# Patient Record
Sex: FEMALE | Race: ASIAN | NOT HISPANIC OR LATINO | URBAN - METROPOLITAN AREA
[De-identification: names, ages, dates, MRNs, and addresses within clinical notes are randomized per-mention and may not be internally consistent; named-entity substitution may affect disease eponyms.]

---

## 2023-11-22 ENCOUNTER — EMERGENCY (EMERGENCY)
Facility: HOSPITAL | Age: 78
LOS: 1 days | Discharge: HOME | End: 2023-11-22
Attending: EMERGENCY MEDICINE
Payer: MEDICARE

## 2023-11-22 VITALS
HEIGHT: 62 IN | TEMPERATURE: 98 F | HEART RATE: 111 BPM | RESPIRATION RATE: 20 BRPM | SYSTOLIC BLOOD PRESSURE: 172 MMHG | WEIGHT: 151.68 LBS | DIASTOLIC BLOOD PRESSURE: 84 MMHG | OXYGEN SATURATION: 97 %

## 2023-11-22 DIAGNOSIS — I10 ESSENTIAL (PRIMARY) HYPERTENSION: ICD-10-CM

## 2023-11-22 DIAGNOSIS — R07.9 CHEST PAIN, UNSPECIFIED: ICD-10-CM

## 2023-11-22 LAB
ALBUMIN SERPL ELPH-MCNC: 4.2 G/DL — SIGNIFICANT CHANGE UP (ref 3.3–5.2)
ALBUMIN SERPL ELPH-MCNC: 4.2 G/DL — SIGNIFICANT CHANGE UP (ref 3.3–5.2)
ALP SERPL-CCNC: 99 U/L — SIGNIFICANT CHANGE UP (ref 40–120)
ALP SERPL-CCNC: 99 U/L — SIGNIFICANT CHANGE UP (ref 40–120)
ALT FLD-CCNC: 35 U/L — HIGH
ALT FLD-CCNC: 35 U/L — HIGH
ANION GAP SERPL CALC-SCNC: 15 MMOL/L — SIGNIFICANT CHANGE UP (ref 5–17)
ANION GAP SERPL CALC-SCNC: 15 MMOL/L — SIGNIFICANT CHANGE UP (ref 5–17)
AST SERPL-CCNC: 43 U/L — HIGH
AST SERPL-CCNC: 43 U/L — HIGH
BASOPHILS # BLD AUTO: 0.03 K/UL — SIGNIFICANT CHANGE UP (ref 0–0.2)
BASOPHILS # BLD AUTO: 0.03 K/UL — SIGNIFICANT CHANGE UP (ref 0–0.2)
BASOPHILS NFR BLD AUTO: 0.3 % — SIGNIFICANT CHANGE UP (ref 0–2)
BASOPHILS NFR BLD AUTO: 0.3 % — SIGNIFICANT CHANGE UP (ref 0–2)
BILIRUB SERPL-MCNC: 0.4 MG/DL — SIGNIFICANT CHANGE UP (ref 0.4–2)
BILIRUB SERPL-MCNC: 0.4 MG/DL — SIGNIFICANT CHANGE UP (ref 0.4–2)
BUN SERPL-MCNC: 9.4 MG/DL — SIGNIFICANT CHANGE UP (ref 8–20)
BUN SERPL-MCNC: 9.4 MG/DL — SIGNIFICANT CHANGE UP (ref 8–20)
CALCIUM SERPL-MCNC: 9.1 MG/DL — SIGNIFICANT CHANGE UP (ref 8.4–10.5)
CALCIUM SERPL-MCNC: 9.1 MG/DL — SIGNIFICANT CHANGE UP (ref 8.4–10.5)
CHLORIDE SERPL-SCNC: 103 MMOL/L — SIGNIFICANT CHANGE UP (ref 96–108)
CHLORIDE SERPL-SCNC: 103 MMOL/L — SIGNIFICANT CHANGE UP (ref 96–108)
CO2 SERPL-SCNC: 22 MMOL/L — SIGNIFICANT CHANGE UP (ref 22–29)
CO2 SERPL-SCNC: 22 MMOL/L — SIGNIFICANT CHANGE UP (ref 22–29)
CREAT SERPL-MCNC: 0.67 MG/DL — SIGNIFICANT CHANGE UP (ref 0.5–1.3)
CREAT SERPL-MCNC: 0.67 MG/DL — SIGNIFICANT CHANGE UP (ref 0.5–1.3)
D DIMER BLD IA.RAPID-MCNC: 313 NG/ML DDU — HIGH
D DIMER BLD IA.RAPID-MCNC: 313 NG/ML DDU — HIGH
EGFR: 91 ML/MIN/1.73M2 — SIGNIFICANT CHANGE UP
EGFR: 91 ML/MIN/1.73M2 — SIGNIFICANT CHANGE UP
EOSINOPHIL # BLD AUTO: 0.06 K/UL — SIGNIFICANT CHANGE UP (ref 0–0.5)
EOSINOPHIL # BLD AUTO: 0.06 K/UL — SIGNIFICANT CHANGE UP (ref 0–0.5)
EOSINOPHIL NFR BLD AUTO: 0.5 % — SIGNIFICANT CHANGE UP (ref 0–6)
EOSINOPHIL NFR BLD AUTO: 0.5 % — SIGNIFICANT CHANGE UP (ref 0–6)
GLUCOSE SERPL-MCNC: 160 MG/DL — HIGH (ref 70–99)
GLUCOSE SERPL-MCNC: 160 MG/DL — HIGH (ref 70–99)
HCT VFR BLD CALC: 46.6 % — HIGH (ref 34.5–45)
HCT VFR BLD CALC: 46.6 % — HIGH (ref 34.5–45)
HGB BLD-MCNC: 15.3 G/DL — SIGNIFICANT CHANGE UP (ref 11.5–15.5)
HGB BLD-MCNC: 15.3 G/DL — SIGNIFICANT CHANGE UP (ref 11.5–15.5)
IMM GRANULOCYTES NFR BLD AUTO: 0.6 % — SIGNIFICANT CHANGE UP (ref 0–0.9)
IMM GRANULOCYTES NFR BLD AUTO: 0.6 % — SIGNIFICANT CHANGE UP (ref 0–0.9)
LYMPHOCYTES # BLD AUTO: 1.88 K/UL — SIGNIFICANT CHANGE UP (ref 1–3.3)
LYMPHOCYTES # BLD AUTO: 1.88 K/UL — SIGNIFICANT CHANGE UP (ref 1–3.3)
LYMPHOCYTES # BLD AUTO: 16.2 % — SIGNIFICANT CHANGE UP (ref 13–44)
LYMPHOCYTES # BLD AUTO: 16.2 % — SIGNIFICANT CHANGE UP (ref 13–44)
MCHC RBC-ENTMCNC: 29.6 PG — SIGNIFICANT CHANGE UP (ref 27–34)
MCHC RBC-ENTMCNC: 29.6 PG — SIGNIFICANT CHANGE UP (ref 27–34)
MCHC RBC-ENTMCNC: 32.8 GM/DL — SIGNIFICANT CHANGE UP (ref 32–36)
MCHC RBC-ENTMCNC: 32.8 GM/DL — SIGNIFICANT CHANGE UP (ref 32–36)
MCV RBC AUTO: 90.1 FL — SIGNIFICANT CHANGE UP (ref 80–100)
MCV RBC AUTO: 90.1 FL — SIGNIFICANT CHANGE UP (ref 80–100)
MONOCYTES # BLD AUTO: 0.51 K/UL — SIGNIFICANT CHANGE UP (ref 0–0.9)
MONOCYTES # BLD AUTO: 0.51 K/UL — SIGNIFICANT CHANGE UP (ref 0–0.9)
MONOCYTES NFR BLD AUTO: 4.4 % — SIGNIFICANT CHANGE UP (ref 2–14)
MONOCYTES NFR BLD AUTO: 4.4 % — SIGNIFICANT CHANGE UP (ref 2–14)
NEUTROPHILS # BLD AUTO: 9.06 K/UL — HIGH (ref 1.8–7.4)
NEUTROPHILS # BLD AUTO: 9.06 K/UL — HIGH (ref 1.8–7.4)
NEUTROPHILS NFR BLD AUTO: 78 % — HIGH (ref 43–77)
NEUTROPHILS NFR BLD AUTO: 78 % — HIGH (ref 43–77)
NT-PROBNP SERPL-SCNC: 44 PG/ML — SIGNIFICANT CHANGE UP (ref 0–300)
NT-PROBNP SERPL-SCNC: 44 PG/ML — SIGNIFICANT CHANGE UP (ref 0–300)
PLATELET # BLD AUTO: 324 K/UL — SIGNIFICANT CHANGE UP (ref 150–400)
PLATELET # BLD AUTO: 324 K/UL — SIGNIFICANT CHANGE UP (ref 150–400)
POTASSIUM SERPL-MCNC: 4.1 MMOL/L — SIGNIFICANT CHANGE UP (ref 3.5–5.3)
POTASSIUM SERPL-MCNC: 4.1 MMOL/L — SIGNIFICANT CHANGE UP (ref 3.5–5.3)
POTASSIUM SERPL-SCNC: 4.1 MMOL/L — SIGNIFICANT CHANGE UP (ref 3.5–5.3)
POTASSIUM SERPL-SCNC: 4.1 MMOL/L — SIGNIFICANT CHANGE UP (ref 3.5–5.3)
PROT SERPL-MCNC: 7.7 G/DL — SIGNIFICANT CHANGE UP (ref 6.6–8.7)
PROT SERPL-MCNC: 7.7 G/DL — SIGNIFICANT CHANGE UP (ref 6.6–8.7)
RBC # BLD: 5.17 M/UL — SIGNIFICANT CHANGE UP (ref 3.8–5.2)
RBC # BLD: 5.17 M/UL — SIGNIFICANT CHANGE UP (ref 3.8–5.2)
RBC # FLD: 14.3 % — SIGNIFICANT CHANGE UP (ref 10.3–14.5)
RBC # FLD: 14.3 % — SIGNIFICANT CHANGE UP (ref 10.3–14.5)
SODIUM SERPL-SCNC: 140 MMOL/L — SIGNIFICANT CHANGE UP (ref 135–145)
SODIUM SERPL-SCNC: 140 MMOL/L — SIGNIFICANT CHANGE UP (ref 135–145)
TROPONIN T, HIGH SENSITIVITY RESULT: 9 NG/L — SIGNIFICANT CHANGE UP (ref 0–51)
TROPONIN T, HIGH SENSITIVITY RESULT: 9 NG/L — SIGNIFICANT CHANGE UP (ref 0–51)
WBC # BLD: 11.61 K/UL — HIGH (ref 3.8–10.5)
WBC # BLD: 11.61 K/UL — HIGH (ref 3.8–10.5)
WBC # FLD AUTO: 11.61 K/UL — HIGH (ref 3.8–10.5)
WBC # FLD AUTO: 11.61 K/UL — HIGH (ref 3.8–10.5)

## 2023-11-22 PROCEDURE — 71045 X-RAY EXAM CHEST 1 VIEW: CPT | Mod: 26

## 2023-11-22 PROCEDURE — 99223 1ST HOSP IP/OBS HIGH 75: CPT

## 2023-11-22 PROCEDURE — 93010 ELECTROCARDIOGRAM REPORT: CPT

## 2023-11-22 RX ORDER — ACETAMINOPHEN 500 MG
650 TABLET ORAL EVERY 6 HOURS
Refills: 0 | Status: DISCONTINUED | OUTPATIENT
Start: 2023-11-22 | End: 2023-11-30

## 2023-11-22 RX ORDER — ASPIRIN/CALCIUM CARB/MAGNESIUM 324 MG
81 TABLET ORAL ONCE
Refills: 0 | Status: COMPLETED | OUTPATIENT
Start: 2023-11-22 | End: 2023-11-22

## 2023-11-22 RX ADMIN — Medication 81 MILLIGRAM(S): at 19:02

## 2023-11-22 NOTE — ED CDU PROVIDER INITIAL DAY NOTE - ATTENDING APP SHARED VISIT CONTRIBUTION OF CARE
I, Jamie Aguillon, performed a face to face bedside interview with this patient regarding history of present illness, review of symptoms and relevant past medical, social and family history.  I completed an independent physical examination. I have communicated the patient’s plan of care and disposition with the ACP.  Pt placed on obs for intermediate risk chest pain  Gen: NAD, well appearing  CV: RRR  Pul: CTA b/l  Abd: Soft, non-distended, non-tender  Neuro: no focal deficits

## 2023-11-22 NOTE — CONSULT NOTE ADULT - ASSESSMENT
75yo F w/ HTN presents for intermittent exertional sub-sternal chest pain started this afternoon.  + intermediate risk chest pain, Trop neg and no active pain currently, ECG: NSR no acute T or ST changes

## 2023-11-22 NOTE — CONSULT NOTE ADULT - NS ATTEND AMEND GEN_ALL_CORE FT
Agree with above.    Possibly cArdiac chest pain  Hypertension uncontrolled  diabetes type 2      negative cardiac enzymes  non-ischemic EKG, sinus rhythm  Normal LV EF w/ mild LVH on echocardiogram    Recommend Nuclear stress test.  Needs further optimization of anti-hypertensive regimen. Agree with above.    Possibly cardiac chest pain  Hypertension uncontrolled  diabetes type 2      negative cardiac enzymes  non-ischemic EKG, sinus rhythm  Normal LV EF w/ mild LVH on echocardiogram    Recommend Nuclear stress test but refuses in patient and defers to outpatient.  Establish with us in office in 1-2 weeks for cardiac stress test.  Needs further optimization of anti-hypertensive regimen. Add HCTZ 25mg Qday  Renal function panel in 1 week.

## 2023-11-22 NOTE — ED PROVIDER NOTE - OBJECTIVE STATEMENT
79 y/o female with PMHx of HTN presents to the ED c/o intermittent, squeezing CP since this afternoon. CP appeared when she was walking. Pt currently endorsing mild CP at the moment. No associated SOB. Pt has never seen a cardiologist prior to today. Pt denies fevers, chills, HA, abdominal pain, N/V/D, urinary symptoms, recent travel and sick contacts.     [employer]: #[id number], Margaret.

## 2023-11-22 NOTE — ED ADULT TRIAGE NOTE - CHIEF COMPLAINT QUOTE
as per son, pt co chest pain and having high pressure today. pt on BP meds, having been taking without missing a dose

## 2023-11-22 NOTE — ED CDU PROVIDER INITIAL DAY NOTE - OBJECTIVE STATEMENT
77 y/o female with PMHx of HTN presents to the ED c/o intermittent, squeezing CP since this afternoon. CP appeared when she was walking. Pt currently endorsing mild CP at the moment. No associated SOB. Pt has never seen a cardiologist prior to today. Pt denies fevers, chills, HA, abdominal pain, N/V/D, urinary symptoms, recent travel and sick contacts.     [employer]: #[id number], Margaret.

## 2023-11-22 NOTE — ED ADULT NURSE REASSESSMENT NOTE - NS ED NURSE REASSESS COMMENT FT1
assumed care of patient from WILMAR CANNON at 1930. patient resting in stretcher. breathing unlabored and equal. patient safety maintained.

## 2023-11-22 NOTE — CONSULT NOTE ADULT - PROBLEM SELECTOR RECOMMENDATION 2
Assess for target organ damage (CKD, papilledema, encephalopathy) BP goal<130/80mmHg  - lifestyle modifications (DASH diet, daily exercise encouraged, weight loss, limit ETOH intake, avoid NSAIDs)   - VS as per unit protocol  - pharmacologic options: Thiazide (Chlorthalidone) with normal GFR, (Loops w/ GFR<30ml/min), ACEi/ARBs, CCBs, if still not at goal start BB    case d/w Dr. Moseley

## 2023-11-22 NOTE — CONSULT NOTE ADULT - PROBLEM SELECTOR RECOMMENDATION 9
Monitor on Tele in CDU overnight for acute arrhythmias, or recurrence, check labs including CBC, BMP, trend CE x3,   - FLP and A1C in AM, check ECG and CXR tonight  - low cholesterol diet, monitor lFts  - will schedule for TTE to assess functional/structural status  - Nuclear stress test as outpatient  - pain management as needed (NTG/MSO4)

## 2023-11-22 NOTE — ED PROVIDER NOTE - CLINICAL SUMMARY MEDICAL DECISION MAKING FREE TEXT BOX
pt with intermediate risk chest pain, exertional in nature, trop neg and no active pain, placed on obs for cardio eval

## 2023-11-22 NOTE — ED PROVIDER NOTE - NEUROLOGICAL, MLM
Alert and oriented, no focal deficits, no motor or sensory deficits. Cimetidine Counseling:  I discussed with the patient the risks of Cimetidine including but not limited to gynecomastia, headache, diarrhea, nausea, drowsiness, arrhythmias, pancreatitis, skin rashes, psychosis, bone marrow suppression and kidney toxicity.

## 2023-11-22 NOTE — ED CDU PROVIDER INITIAL DAY NOTE - CLINICAL SUMMARY MEDICAL DECISION MAKING FREE TEXT BOX
pt with intermediate risk chest pain, exertional in nature, age adjusted d-dimer negative, trop neg and no active pain, placed on obs for cardio eval

## 2023-11-22 NOTE — CONSULT NOTE ADULT - SUBJECTIVE AND OBJECTIVE BOX
[facility]                                              CARDIOLOGY AT [facility]                                                   [address], [place] [address]                                             Telephone: [phone]. Fax:[phone]                                                       CARDIOLOGY CONSULTATION NOTE                                                                                             History obtained by: Patient and medical record  Community Cardiologist: None   obtained: Yes [x] No [  ]  Reason for Consultation: chest pain  Available out pt records reviewed: Yes [x] No [  ]    Chief complaint:  chest pain    HPI: Patient is a 77yo F w/ PMHx of HTN presents to [facility] EDU c/o intermittent, squeezing mid sternal chest pain since this afternoon.  Patient states her chest pain started when she was walking.  Pain is described as dull, 5/10 in severity, mid sternal w/o any associated symptoms, lasting anywhere from a few seconds to a minute.  No prior similar episodes reported.  Currently still with mild chest discomfort.  No associated HA, palpitations, diaphoresis, SOB, orthopnea, LE edema, back or abdominal pain or fever reported.  Patient has never seen a cardiologist.  Further denies recent travel or sick contacts.  Trop I: negative  ECG: NSR  d-dimer: 313    CARDIAC TESTING   ECHO: Pen  STRESS:  CATH:   ELECTROPHYSIOLOGY:     PAST MEDICAL HISTORY  HTN (hypertension)    PAST SURGICAL HISTORY  NOne    SOCIAL HISTORY:  Denies smoking/alcohol/drugs    FAMILY HISTORY: None    Family History of Cardiovascular Disease:  Yes [  ] No [  ]  Coronary Artery Disease in first degree relative: Yes [  ] No [  ]  Sudden Cardiac Death in First degree relative: Yes [  ] No [  ]    HOME MEDICATIONS:    CURRENT CARDIAC MEDICATIONS:    CURRENT OTHER MEDICATIONS:  acetaminophen     Tablet .. 650 milliGRAM(s) Oral every 6 hours PRN Moderate Pain (4 - 6)    ALLERGIES: No Known Allergies    REVIEW OF SYMPTOMS:   CONSTITUTIONAL: No fever, no chills, no weight loss, no weight gain, no fatigue   ENMT:  No vertigo; No sinus or throat pain  NECK: No pain or stiffness  CARDIOVASCULAR: + chest pain, no dyspnea, no syncope/presyncope, no palpitations, no dizziness, no Orthopnea, no Paroxsymal nocturnal dyspnea  RESPIRATORY: no Shortness of breath, no cough, no wheezing  : No dysuria, no hematuria   GI: No dark color stool, no nausea, no diarrhea, no constipation, no abdominal pain   NEURO: No headache, no slurred speech   MUSCULOSKELETAL: No joint pain or swelling; No muscle, back, or extremity pain  PSYCH: No agitation, no anxiety.    ALL OTHER REVIEW OF SYSTEMS ARE NEGATIVE.    VITAL SIGNS:  T(C): 36.5 (11-22-23 @ 19:50), Max: 36.5 (11-22-23 @ 19:50)  T(F): 97.7 (11-22-23 @ 19:50), Max: 97.7 (11-22-23 @ 19:50)  HR: 107 (11-22-23 @ 19:50) (107 - 111)  BP: 179/93 (11-22-23 @ 19:50) (172/84 - 179/93)  RR: 18 (11-22-23 @ 19:50) (18 - 20)  SpO2: 97% (11-22-23 @ 19:50) (97% - 97%)    INTAKE AND OUTPUT:     PHYSICAL EXAM:  Constitutional: Comfortable, no acute distress   HEENT: Atraumatic and normocephalic, neck is supple, no JVD  CNS: A&Ox3. No focal deficits   Respiratory: CTAB, unlabored   Cardiovascular: RRR normal S1S2, no murmur or rubs  Gastrointestinal: Soft, non-tender +Bowel sounds   Extremities: 2+ Peripheral Pulses, no cyanosis, or edema  Psychiatric: Calm, no agitation   Skin: Warm and dry, no ulcers on extremities     LABS:                        15.3   11.61 )-----------( 324      ( 22 Nov 2023 19:11 )             46.6     11-22    140  |  103  |  9.4  ----------------------------<  160<H>  4.1   |  22.0  |  0.67    Ca    9.1      22 Nov 2023 19:11    TPro  7.7  /  Alb  4.2  /  TBili  0.4  /  DBili  x   /  AST  43<H>  /  ALT  35<H>  /  AlkPhos  99  11-22    Urinalysis Basic - ( 22 Nov 2023 19:11 )  Color: x / Appearance: x / SG: x / pH: x  Gluc: 160 mg/dL / Ketone: x  / Bili: x / Urobili: x   Blood: x / Protein: x / Nitrite: x   Leuk Esterase: x / RBC: x / WBC x   Sq Epi: x / Non Sq Epi: x / Bacteria: x    INTERPRETATION OF TELEMETRY:   ECG: NSR  Prior ECG: Yes [  ] No [x]    RADIOLOGY & ADDITIONAL STUDIES:    X-ray:    CT scan:   MRI:   US:

## 2023-11-22 NOTE — ED ADULT NURSE NOTE - OBJECTIVE STATEMENT
78 yof presents to ed with left side chest pain radiates to breast x since monday. went to pmd got ekg and not sent to md. pt here with worsening symptoms.

## 2023-11-22 NOTE — ED CDU PROVIDER INITIAL DAY NOTE - NS ED ATTENDING STATEMENT MOD
This was a shared visit with the MILY. I reviewed and verified the documentation and independently performed the documented:

## 2023-11-23 VITALS
SYSTOLIC BLOOD PRESSURE: 146 MMHG | TEMPERATURE: 98 F | DIASTOLIC BLOOD PRESSURE: 82 MMHG | RESPIRATION RATE: 18 BRPM | HEART RATE: 85 BPM | OXYGEN SATURATION: 98 %

## 2023-11-23 LAB
A1C WITH ESTIMATED AVERAGE GLUCOSE RESULT: 7.3 % — HIGH (ref 4–5.6)
A1C WITH ESTIMATED AVERAGE GLUCOSE RESULT: 7.3 % — HIGH (ref 4–5.6)
CHOLEST SERPL-MCNC: 158 MG/DL — SIGNIFICANT CHANGE UP
CHOLEST SERPL-MCNC: 158 MG/DL — SIGNIFICANT CHANGE UP
ESTIMATED AVERAGE GLUCOSE: 163 MG/DL — HIGH (ref 68–114)
ESTIMATED AVERAGE GLUCOSE: 163 MG/DL — HIGH (ref 68–114)
HDLC SERPL-MCNC: 66 MG/DL — SIGNIFICANT CHANGE UP
HDLC SERPL-MCNC: 66 MG/DL — SIGNIFICANT CHANGE UP
LIPID PNL WITH DIRECT LDL SERPL: 80 MG/DL — SIGNIFICANT CHANGE UP
LIPID PNL WITH DIRECT LDL SERPL: 80 MG/DL — SIGNIFICANT CHANGE UP
NON HDL CHOLESTEROL: 92 MG/DL — SIGNIFICANT CHANGE UP
NON HDL CHOLESTEROL: 92 MG/DL — SIGNIFICANT CHANGE UP
TRIGL SERPL-MCNC: 61 MG/DL — SIGNIFICANT CHANGE UP
TRIGL SERPL-MCNC: 61 MG/DL — SIGNIFICANT CHANGE UP
TROPONIN T, HIGH SENSITIVITY RESULT: 10 NG/L — SIGNIFICANT CHANGE UP (ref 0–51)
TROPONIN T, HIGH SENSITIVITY RESULT: 10 NG/L — SIGNIFICANT CHANGE UP (ref 0–51)

## 2023-11-23 PROCEDURE — 80053 COMPREHEN METABOLIC PANEL: CPT

## 2023-11-23 PROCEDURE — 99233 SBSQ HOSP IP/OBS HIGH 50: CPT

## 2023-11-23 PROCEDURE — 93306 TTE W/DOPPLER COMPLETE: CPT | Mod: 26

## 2023-11-23 PROCEDURE — 93005 ELECTROCARDIOGRAM TRACING: CPT

## 2023-11-23 PROCEDURE — 99285 EMERGENCY DEPT VISIT HI MDM: CPT | Mod: 25

## 2023-11-23 PROCEDURE — 85025 COMPLETE CBC W/AUTO DIFF WBC: CPT

## 2023-11-23 PROCEDURE — 84484 ASSAY OF TROPONIN QUANT: CPT

## 2023-11-23 PROCEDURE — 71045 X-RAY EXAM CHEST 1 VIEW: CPT

## 2023-11-23 PROCEDURE — 83880 ASSAY OF NATRIURETIC PEPTIDE: CPT

## 2023-11-23 PROCEDURE — 85379 FIBRIN DEGRADATION QUANT: CPT

## 2023-11-23 PROCEDURE — G0378: CPT

## 2023-11-23 PROCEDURE — 99239 HOSP IP/OBS DSCHRG MGMT >30: CPT

## 2023-11-23 PROCEDURE — 93306 TTE W/DOPPLER COMPLETE: CPT

## 2023-11-23 PROCEDURE — 36415 COLL VENOUS BLD VENIPUNCTURE: CPT

## 2023-11-23 PROCEDURE — 80061 LIPID PANEL: CPT

## 2023-11-23 PROCEDURE — 83036 HEMOGLOBIN GLYCOSYLATED A1C: CPT

## 2023-11-23 RX ORDER — ATORVASTATIN CALCIUM 80 MG/1
1 TABLET, FILM COATED ORAL
Refills: 0 | DISCHARGE

## 2023-11-23 RX ORDER — AMLODIPINE BESYLATE 2.5 MG/1
1 TABLET ORAL
Refills: 0 | DISCHARGE

## 2023-11-23 RX ORDER — HYDROCHLOROTHIAZIDE 25 MG
1 TABLET ORAL
Qty: 14 | Refills: 0
Start: 2023-11-23 | End: 2023-12-06

## 2023-11-23 RX ORDER — ATORVASTATIN CALCIUM 80 MG/1
10 TABLET, FILM COATED ORAL AT BEDTIME
Refills: 0 | Status: DISCONTINUED | OUTPATIENT
Start: 2023-11-23 | End: 2023-11-30

## 2023-11-23 RX ORDER — HYDROCHLOROTHIAZIDE 25 MG
1 TABLET ORAL
Qty: 15 | Refills: 0
Start: 2023-11-23 | End: 2023-12-07

## 2023-11-23 RX ORDER — AMLODIPINE BESYLATE 2.5 MG/1
10 TABLET ORAL DAILY
Refills: 0 | Status: DISCONTINUED | OUTPATIENT
Start: 2023-11-23 | End: 2023-11-30

## 2023-11-23 RX ADMIN — AMLODIPINE BESYLATE 10 MILLIGRAM(S): 2.5 TABLET ORAL at 10:44

## 2023-11-23 NOTE — ED CDU PROVIDER DISPOSITION NOTE - PATIENT PORTAL LINK FT
You can access the FollowMyHealth Patient Portal offered by [facility] by registering at the following website: [email or web address]. By joining [facility] FollowMyHealth portal, you will also be able to view your health information using other applications (apps) compatible with our system.

## 2023-11-23 NOTE — ED CDU PROVIDER DISPOSITION NOTE - CARE PROVIDER_API CALL
Sanjiv Moseley  Cardiovascular Disease  [address], [address] [address]  Phone: [phone]  Fax: [phone]  Follow Up Time:

## 2023-11-23 NOTE — ED CDU PROVIDER SUBSEQUENT DAY NOTE - PROGRESS NOTE DETAILS
Patient at Echo. Son at bedside who gave provider BP name/dosing. BP meds ordered. Cardiology following.

## 2023-11-23 NOTE — ED CDU PROVIDER SUBSEQUENT DAY NOTE - CLINICAL SUMMARY MEDICAL DECISION MAKING FREE TEXT BOX
76 year old female with PMHx  HTN presented to ED for intermittent exertional sub-sternal chest pain started this afternoon.  + intermediate risk chest pain, CE negative x2  and no active pain currently  EKG no ischemic changes  TTE, Tele  Pt son to bring home meds in AM. 76 year old female with PMHx  HTN presented to ED for intermittent exertional sub-sternal chest pain started this afternoon.     CE negative x2  and no active pain currently  EKG no ischemic changes  TTE, Tele  Pt son to bring home meds in AM.

## 2023-11-23 NOTE — ED CDU PROVIDER DISPOSITION NOTE - ATTENDING APP SHARED VISIT CONTRIBUTION OF CARE
Patient placed in CDU for chest pain rule out ACS, had transthoracic echocardiogram which was normal, serial Trope negative, seen by cardiology, plan for outpatient follow-up.

## 2023-11-23 NOTE — ED ADULT NURSE REASSESSMENT NOTE - NS ED NURSE REASSESS COMMENT FT1
Assumed care of pt from night shift RN. Aox3. Son bedside. Pt resting comfortbly in stretcher. Denies chest pain, SOB, abd pain, back pain, headaches, dizziness, lightheadedness, fevers, chills, nausea, vomiting, diarrhea, constipation and dysuria. Respirations even and unlabored. SKin warm to touch. Plan, abnormal labs, history of present illness, pending labs/tests reviewed.

## 2023-11-23 NOTE — ED CDU PROVIDER DISPOSITION NOTE - NSFOLLOWUPINSTRUCTIONS_ED_ALL_ED_FT
Chest Pain    Chest pain can be caused by many different conditions which may or may not be dangerous. Causes include heartburn, lung infections, heart attack, blood clot in lungs, skin infections, strain or damage to muscle, cartilage, or bones, etc. In addition to a history and physical examination, an electrocardiogram (ECG) or other lab tests may have been performed to determine the cause of your chest pain. Follow up with your primary care provider or with a cardiologist as instructed.     SEEK IMMEDIATE MEDICAL CARE IF YOU HAVE ANY OF THE FOLLOWING SYMPTOMS: worsening chest pain, coughing up blood, unexplained back/neck/jaw pain, severe abdominal pain, dizziness or lightheadedness, fainting, shortness of breath, sweaty or clammy skin, vomiting, or racing heart beat. These symptoms may represent a serious problem that is an emergency. Do not wait to see if the symptoms will go away. Get medical help right away. Call 911 and do not drive yourself to the hospital.    Please follow up with Cardiology office. Please take medications as directed.

## 2023-11-23 NOTE — ED ADULT NURSE REASSESSMENT NOTE - NS ED NURSE REASSESS COMMENT FT1
patient resting in stretcher displaying no s/s of acute distress. patient makes no complaints at this time. updated on plan of care and understands with verbal feedback. safety maintained.

## 2023-11-23 NOTE — ED CDU PROVIDER DISPOSITION NOTE - CLINICAL COURSE
79 y/o female with pmhx HTN presents to ED for chest pain. While in ED, labs revealed mild leukocytosis, D-dimer of 313, non-ischemic EKG, elevated A1c of 7.3 and Glu 163, lipid panel wnl, troponin wnl. CXR reveals no acute pathology. Patient placed in observation for cardiology and Echo revealed EF ~60-65%. Cardiology recommends nuclear stress test, patient would prefer outpatient. Cardiology also adding anti-HTN HCTZ 25 mg Q-day and renal function panel in 1 week. Glu and a1c elevated, recommend PCP follow up. Patient spoken to with  and agreeable to discharge plan and follow up plan. Plan discussed with Attending. 77 y/o female with pmhx HTN presents to ED for chest pain. While in ED, labs revealed mild leukocytosis, D-dimer of 313, non-ischemic EKG, elevated A1c of 7.3 and Glu 163, lipid panel wnl, troponin wnl. CXR reveals no acute pathology. Patient placed in observation for cardiology and Echo revealed EF ~60-65%. Cardiology recommends nuclear stress test, patient would prefer outpatient. Cardiology also adding anti-HTN HCTZ 25 mg Q-day and renal function panel in 1 week. Glu and a1c elevated, recommend PCP follow up. Patient spoken to with  and agreeable to discharge plan and follow up plan. Plan discussed with Attending. Son contacted and given discharge instructions. Language line [phone] Patient explained all results and plan to follow up with cardiology for stress test. Patient also made aware of elevated Glucose and A1C and agreeable to PCP follow up to begin meds. All patient questions answered. Care coordinator contacted for assistance with appointments.

## 2023-11-23 NOTE — ED CDU PROVIDER SUBSEQUENT DAY NOTE - ATTENDING APP SHARED VISIT CONTRIBUTION OF CARE
Attending Contribution to Care: Patient placed in CDU for chest pain rule out ACS, had transthoracic echocardiogram which was normal, serial Trope negative, seen by cardiology, plan for outpatient follow-up.

## 2023-11-23 NOTE — ED CDU PROVIDER SUBSEQUENT DAY NOTE - HISTORY
Pt resting comfortably at time of re-assessment. No events overnight. Pending TTE. Will continue to monitor.